# Patient Record
Sex: MALE | Race: WHITE | NOT HISPANIC OR LATINO
[De-identification: names, ages, dates, MRNs, and addresses within clinical notes are randomized per-mention and may not be internally consistent; named-entity substitution may affect disease eponyms.]

---

## 2023-06-05 PROBLEM — Z00.00 ENCOUNTER FOR PREVENTIVE HEALTH EXAMINATION: Status: ACTIVE | Noted: 2023-06-05

## 2023-06-08 ENCOUNTER — APPOINTMENT (OUTPATIENT)
Dept: GASTROENTEROLOGY | Facility: CLINIC | Age: 58
End: 2023-06-08
Payer: COMMERCIAL

## 2023-06-08 DIAGNOSIS — Z78.9 OTHER SPECIFIED HEALTH STATUS: ICD-10-CM

## 2023-06-08 DIAGNOSIS — Z86.39 PERSONAL HISTORY OF OTHER ENDOCRINE, NUTRITIONAL AND METABOLIC DISEASE: ICD-10-CM

## 2023-06-08 DIAGNOSIS — Z12.11 ENCOUNTER FOR SCREENING FOR MALIGNANT NEOPLASM OF COLON: ICD-10-CM

## 2023-06-08 PROCEDURE — 99443: CPT

## 2023-06-08 RX ORDER — POLYETHYLENE GLYCOL 3350 AND ELECTROLYTES WITH LEMON FLAVOR 236; 22.74; 6.74; 5.86; 2.97 G/4L; G/4L; G/4L; G/4L; G/4L
236 POWDER, FOR SOLUTION ORAL
Qty: 1 | Refills: 0 | Status: ACTIVE | COMMUNITY
Start: 2023-06-08 | End: 1900-01-01

## 2023-06-08 NOTE — HISTORY OF PRESENT ILLNESS
[Home] : at home, [unfilled] , at the time of the visit. [Other Location: e.g. Home (Enter Location, City,State)___] : at [unfilled] [FreeTextEntry3] : Kristal - Daughter  [FreeTextEntry4] : Eben [FreeTextEntry1] : Patient is a 57-year-old gentleman with past medical history of hyperlipidemia that was referred for screening colonoscopy.  Patient overall feels well and without any abdominal issues.

## 2023-06-08 NOTE — ASSESSMENT
[FreeTextEntry1] : Patient is a 57-year-old gentleman with past medical history of hyperlipidemia that was referred for screening colonoscopy.  Patient overall feels well and without any abdominal issues.\par \par Colon Cancer Screening\par - GoLYTELY prep \par - Risk and benefit explained in detail to the patient\par - Clear liquid diet the day before the procedure\par - Finish all of the prescribed prep as ordered to insure good visualization\par - Please refrain from any NSAID use including ASA one week before\par - Follow up 3-4 weeks after the procedure stressed to insure follow up on pathology and planning for future screenings\par